# Patient Record
Sex: FEMALE | Race: WHITE
[De-identification: names, ages, dates, MRNs, and addresses within clinical notes are randomized per-mention and may not be internally consistent; named-entity substitution may affect disease eponyms.]

---

## 2020-08-14 ENCOUNTER — HOSPITAL ENCOUNTER (EMERGENCY)
Dept: HOSPITAL 47 - EC | Age: 45
Discharge: HOME | End: 2020-08-14
Payer: COMMERCIAL

## 2020-08-14 VITALS — DIASTOLIC BLOOD PRESSURE: 90 MMHG | SYSTOLIC BLOOD PRESSURE: 132 MMHG

## 2020-08-14 VITALS — HEART RATE: 90 BPM | TEMPERATURE: 97.7 F

## 2020-08-14 VITALS — RESPIRATION RATE: 16 BRPM

## 2020-08-14 DIAGNOSIS — R06.02: Primary | ICD-10-CM

## 2020-08-14 DIAGNOSIS — Z88.0: ICD-10-CM

## 2020-08-14 DIAGNOSIS — Z88.1: ICD-10-CM

## 2020-08-14 PROCEDURE — 71046 X-RAY EXAM CHEST 2 VIEWS: CPT

## 2020-08-14 PROCEDURE — 99285 EMERGENCY DEPT VISIT HI MDM: CPT
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
